# Patient Record
Sex: FEMALE | Race: OTHER | Employment: UNEMPLOYED | URBAN - METROPOLITAN AREA
[De-identification: names, ages, dates, MRNs, and addresses within clinical notes are randomized per-mention and may not be internally consistent; named-entity substitution may affect disease eponyms.]

---

## 2023-03-06 ENCOUNTER — HOSPITAL ENCOUNTER (EMERGENCY)
Age: 23
Discharge: HOME | End: 2023-03-06
Attending: EMERGENCY MEDICINE

## 2023-03-06 VITALS
DIASTOLIC BLOOD PRESSURE: 62 MMHG | HEIGHT: 66 IN | HEART RATE: 73 BPM | BODY MASS INDEX: 22.5 KG/M2 | OXYGEN SATURATION: 96 % | SYSTOLIC BLOOD PRESSURE: 104 MMHG | TEMPERATURE: 98.3 F | WEIGHT: 140 LBS | RESPIRATION RATE: 14 BRPM

## 2023-03-06 DIAGNOSIS — F10.920 ACUTE ALCOHOLIC INTOXICATION WITHOUT COMPLICATION (HCC): Primary | ICD-10-CM

## 2023-03-06 LAB
ACETAMINOPHEN LEVEL: <5 UG/ML (ref 10–30)
CHP ED QC CHECK: YES
ETHANOL PERCENT: 0.29 %
ETHANOL: 291 MG/DL
GLUCOSE BLD-MCNC: 105 MG/DL
GLUCOSE BLD-MCNC: 105 MG/DL (ref 65–105)
HCG QUALITATIVE: NEGATIVE
SALICYLATE LEVEL: <1 MG/DL (ref 3–10)
TOXIC TRICYCLIC SC,BLOOD: NEGATIVE

## 2023-03-06 PROCEDURE — 99284 EMERGENCY DEPT VISIT MOD MDM: CPT

## 2023-03-06 PROCEDURE — 2580000003 HC RX 258

## 2023-03-06 PROCEDURE — 82947 ASSAY GLUCOSE BLOOD QUANT: CPT

## 2023-03-06 PROCEDURE — 84703 CHORIONIC GONADOTROPIN ASSAY: CPT

## 2023-03-06 PROCEDURE — 80143 DRUG ASSAY ACETAMINOPHEN: CPT

## 2023-03-06 PROCEDURE — 93005 ELECTROCARDIOGRAM TRACING: CPT

## 2023-03-06 PROCEDURE — G0480 DRUG TEST DEF 1-7 CLASSES: HCPCS

## 2023-03-06 PROCEDURE — 80179 DRUG ASSAY SALICYLATE: CPT

## 2023-03-06 PROCEDURE — 80307 DRUG TEST PRSMV CHEM ANLYZR: CPT

## 2023-03-06 RX ORDER — 0.9 % SODIUM CHLORIDE 0.9 %
1000 INTRAVENOUS SOLUTION INTRAVENOUS ONCE
Status: COMPLETED | OUTPATIENT
Start: 2023-03-06 | End: 2023-03-06

## 2023-03-06 RX ADMIN — SODIUM CHLORIDE 1000 ML: 9 INJECTION, SOLUTION INTRAVENOUS at 02:00

## 2023-03-06 RX ADMIN — SODIUM CHLORIDE 1000 ML: 9 INJECTION, SOLUTION INTRAVENOUS at 05:34

## 2023-03-06 ASSESSMENT — PAIN - FUNCTIONAL ASSESSMENT: PAIN_FUNCTIONAL_ASSESSMENT: NONE - DENIES PAIN

## 2023-03-06 NOTE — ED NOTES
The following labs labeled with pt sticker and tubed to lab:     [x] Blue     [x] Lavender   [] on ice  [x] Green/yellow  [x] Green/black [] on ice  [] Yellow  [] Red  [] Pink      [] COVID-19 swab    [] Rapid  [] PCR  [] Flu Swab  [] Strep Swab  [] Peds Viral Panel     [] Urine Sample  [] Pelvic Cultures  [] Blood Cultures   [] Wound Cultures          Singh Hsieh RN  03/06/23 015

## 2023-03-06 NOTE — ED PROVIDER NOTES
[facility]   Emergency Department  Faculty Attestation       I performed a history and physical examination of the patient and discussed management with the resident. I reviewed the residents note and agree with the documented findings including all diagnostic interpretations and plan of care. Any areas of disagreement are noted on the chart. I was personally present for the key portions of any procedures. I have documented in the chart those procedures where I was not present during the key portions. I have reviewed the emergency nurses triage note. I agree with the chief complaint, past medical history, past surgical history, allergies, medications, social and family history as documented unless otherwise noted below. Documentation of the HPI, Physical Exam and Medical Decision Making performed by justinibisabelle is based on my personal performance of the HPI, PE and MDM. For Physician Assistant/ Nurse Practitioner cases/documentation I have personally evaluated this patient and have completed at least one if not all key elements of the E/M (history, physical exam, and MDM). Additional findings are as noted. Pertinent Comments     Primary Care Physician: No primary care provider on file. ED Triage Vitals   BP Temp Temp Source Heart Rate Resp SpO2 Height Weight   03/06/23 0139 03/06/23 0144 03/06/23 0144 03/06/23 0144 03/06/23 0144 03/06/23 0144 03/06/23 0144 03/06/23 0144   110/78 98.3 °F (36.8 °C) Oral 76 17 96 % 5' 6\" (1.676 m) 140 lb (63.5 kg)        This is a 25 y.o. female who presents to the Emergency Department w/ intoxication. Patient brought by EMS from local bar. Patient is [place] speaking and visitor is also [place] speaking, but is alert and oriented.  used in the room. Patient had been drinking significant amounts at the bar, no other known substances. On exam, patient is obtunded but does awaken to loud verbal stimuli and sternal rub.   Pupils are approximate 2 mm MIPS and equal reactive bilaterally. No signs of head trauma. Heart sounds regular lungs auscultation bilaterally. Abdomen is soft nontender nondistended. Is drowsy but arousable as mentioned, not answering many questions at this time. However did spontaneously move extremities. Medical Decision Making  Patient is altered likely secondary to alcohol intoxication  No signs of outward trauma  We will check basic labs and then reevaluate. Patient now clinically sober okay to discharge with friend. Problems Addressed:  Acute alcoholic intoxication without complication ([facility] [address]): acute illness or injury    Amount and/or Complexity of Data Reviewed  Independent Historian:      Details: EMS, acquaintance  Labs: ordered. Decision-making details documented in ED Course. ECG/medicine tests: ordered and independent interpretation performed. Risk  Prescription drug management. EKG Interpretation    Interpreted by emergency department physician    Clinical Impression: Normal sinus with sinus arrhythmia.      Landon Amezcua MD    Critical Care: None     Landon Amezcua MD  Attending Emergency Physician         Landon Amezcua MD  03/06/23 1217

## 2023-03-06 NOTE — ED NOTES
Pt presents to the ED via [facility] with c/o alcohol intoxication. Pt is only Sam[place] speaking and presents to the ED with significant other. Upon arrival patient is minimally responsive, but responds to pain. Pt placed on full cardiac monitor,  obtained. Per significant other, pt was drinking at a bar and consumed approx 4 \"glasses\" of tequila and 3 corona beers. Significant other states patient has never been to this hospital before, recent travel to [place] during the past month. Denies any recent sick contacts. Unsure if if patient takes any daily medications or has any current medical problems. [facility] glucose obtained. Pt's vitals stable. Call light in reach, white board updated.          Sneha Dent RN  03/06/23 2432

## 2023-03-06 NOTE — ED NOTES
Pt more arousable, still not at baseline. Pt remains on monitor Pt is resting on stretcher with call light within reach. Breathing is non labored and no acute distress is noted.  Will continue to follow plan of care       Rigo Baker RN  03/06/23 6995

## 2023-03-06 NOTE — ED NOTES
ED SW called out to the ED Lobby as patient and her 2 friends needed a ride home. They were told that the wait time could be 3 hours through their insurance and they chose not to wait. When SW reviewed the patient's chart, she did not have any insurance listed. Patient at her sober time as was the other patient with her. Bettina Lucero.  [facility]  03/06/23 1019

## 2023-03-06 NOTE — DISCHARGE INSTRUCTIONS
Thank you for visiting [facility] Emergency Department. You need to call or [phone] [facility] to make an appointment as directed for follow up. Should you have any questions regarding your care or further treatment, please call [facility] at [phone]. Please return to emergency department for any new or worrisome symptoms including any vomiting, fever, vision changes, headache.

## 2023-03-06 NOTE — ED PROVIDER NOTES
[facility]  ED  Emergency Department Encounter  Emergency Medicine Resident     Pt Tran Lu  MRN: [id number]  [place] 2000  Date of evaluation: 3/6/23  PCP:  No primary care provider on file. Note Started: 1:46 AM EST      CHIEF COMPLAINT       Chief Complaint   Patient presents with    Alcohol Intoxication     4 \"drinks of tequilla\", 3 corona        HISTORY OF PRESENT ILLNESS  (Location/Symptom, Timing/Onset, Context/Setting, Quality, Duration, Modifying Factors, Severity.)      Anita Uribe is a 25 y.o. female who presents with complaints of alcohol intoxication, drowsiness. Significant other noted patient had been drinking alcohol but unsure if patient had any medical drugs. Has history of hyperglycemia but denies any other medical issues. Patient only responding to pain. PAST MEDICAL / SURGICAL / SOCIAL / FAMILY HISTORY      has no past medical history on file. Reviewed with patient significant other     has no past surgical history on file. Reviewed with patient significant other    Social History     Socioeconomic History    Marital status:      Spouse name: Not on file    Number of children: Not on file    Years of education: Not on file    Highest education level: Not on file   Occupational History    Not on file   Tobacco Use    Smoking status: Not on file    Smokeless tobacco: Not on file   Substance and Sexual Activity    Alcohol use: Not on file    Drug use: Not on file    Sexual activity: Not on file   Other Topics Concern    Not on file   Social History Narrative    Not on file     Social Determinants of Health     Financial Resource Strain: Not on file   Food Insecurity: Not on file   Transportation Needs: Not on file   Physical Activity: Not on file   Stress: Not on file   Social Connections: Not on file   Intimate Partner Violence: Not on file   Housing Stability: Not on file       No family history on file.     Allergies:  Patient has no known allergies. Home Medications:  Prior to Admission medications    Not on File         REVIEW OF SYSTEMS    (2-9 systems for level 4, 10 or more for level 5)      Review of Systems   Limited based on patient mentation    PHYSICAL EXAM   (up to 7 for level 4, 8 or more for level 5)      INITIAL VITALS:   /62   Pulse 73   Temp 98.3 °F (36.8 °C) (Oral)   Resp 14   Ht 5' 6\" (1.676 m)   Wt 140 lb (63.5 kg)   SpO2 96%   BMI 22.60 kg/m²     Physical Exam  Vitals and nursing note reviewed. Constitutional:       General: She is drowsy, only responsive to sternal rub  HENT:      Head: Atraumatic. Right Ear: External ear normal.      Left Ear: External ear normal.      Nose: Nose normal.      Mouth/Throat:      Mouth: Mucous membranes are moist.      Pharynx: Oropharynx is clear. Eyes:      Conjunctiva/sclera: Conjunctivae normal.  Pupils 2 mm, reactive to light  Cardiovascular:      Rate and Rhythm: Normal rate and regular rhythm. Pulses: Normal pulses. Pulmonary:      Effort: Pulmonary effort is normal. No respiratory distress. Breath sounds: Normal breath sounds. No wheezing. Abdominal:      Palpations: Abdomen is soft. Tenderness: There is no abdominal tenderness. Skin:     General: Skin is warm and dry. Capillary Refill: Capillary refill takes less than 2 seconds. Neurological:      General: No focal deficit present. Mental Status: She is alert and oriented to person, place, and time. DDX/DIAGNOSTIC RESULTS / EMERGENCY DEPARTMENT COURSE / MDM     Medical Decision Making  Amount and/or Complexity of Data Reviewed  Labs: ordered. Decision-making details documented in ED Course. ECG/medicine tests: ordered. Risk  Prescription drug management.         EKG  Normal sinus rhythm without any ST or T wave changes    All EKG's are interpreted by the Emergency Department Physician who either signs or Co-signs this chart in the absence of a cardiologist.    Alex Kenny DEPARTMENT COURSE:  69-year-old female presented to ED with complaints of altered mental status per significant other after drinking significant mount of alcohol. Significant other denied any illegal drug use. Patient only responsive to pain on arrival but protecting airway and hemodynamically stable. Glucose within normal limits. ED tox negative. Alcohol 291. Sober time 9 AM.  Patient reassessed at approximately 5 AM and patient slightly more responsive and did open eyes to pain. Patient mentation improved and patient awake, answering questions on reassessment. Denied any headache, illegal drug use, vomiting. Patient discharged home with sober ride. Instructed to return for any headache, vomiting, chest pain, shortness of breath. ED Course as of 03/06/23 0711   Mon Mar 06, 2023   0301 [address](!): 291 [AR]   0301 hCG Qual: NEGATIVE [AR]   0301 POC Glucose: 105 [AR]   0301 Sober time 0915 [AR]   0543 Patient awake, responsive to questions using  services. Denied any headache, vomiting, nausea. Denied any illegal drug use. Patient near sober time but has a reliable friend with her to be discharged with. [AR]      ED Course User Index  [AR] Berkley Irene MD       PROCEDURES:  None    CONSULTS:  None      FINAL IMPRESSION      1. Acute alcoholic intoxication without complication ([facility] [address]          DISPOSITION / PLAN     DISPOSITION Decision To Discharge 03/06/2023 06:31:48 AM      PATIENT REFERRED TO:  [address] [place] [id number] [phone]  Call in 3 days      [facility] ED  [facility]  [phone]    As needed    DISCHARGE MEDICATIONS:  There are no discharge medications for this patient.       Katharine Tapia MD  Emergency Medicine Resident    (Please note that portions of thisnote were completed with a voice recognition program.  Efforts were made to edit the dictations but occasionally words are mis-transcribed.)        Magaly Morin MD  Resident  03/06/23 0698       Magaly Morin MD  Resident  03/06/23 3319       Magaly Morin MD  Resident  03/06/23 8625

## 2023-03-09 LAB
EKG ATRIAL RATE: 72 BPM
EKG P AXIS: 42 DEGREES
EKG P-R INTERVAL: 170 MS
EKG Q-T INTERVAL: 374 MS
EKG QRS DURATION: 86 MS
EKG QTC CALCULATION (BAZETT): 409 MS
EKG R AXIS: 16 DEGREES
EKG T AXIS: 5 DEGREES
EKG VENTRICULAR RATE: 72 BPM

## 2023-03-09 PROCEDURE — 93010 ELECTROCARDIOGRAM REPORT: CPT | Performed by: INTERNAL MEDICINE
